# Patient Record
Sex: FEMALE | Race: WHITE | URBAN - NONMETROPOLITAN AREA
[De-identification: names, ages, dates, MRNs, and addresses within clinical notes are randomized per-mention and may not be internally consistent; named-entity substitution may affect disease eponyms.]

---

## 2020-09-28 ENCOUNTER — TELEPHONE (OUTPATIENT)
Dept: OBGYN CLINIC | Age: 40
End: 2020-09-28

## 2020-09-28 RX ORDER — LEVONORGESTREL AND ETHINYL ESTRADIOL 0.1-0.02MG
KIT ORAL
COMMUNITY
Start: 2020-07-10 | End: 2020-09-28 | Stop reason: SDUPTHER

## 2020-09-28 NOTE — TELEPHONE ENCOUNTER
Pt called to resched from Ana Rosa's sched. She will need [facility] refill sent to [facility] please. Sched 11-17.  THANKS

## 2020-09-29 RX ORDER — LEVONORGESTREL AND ETHINYL ESTRADIOL 0.1-0.02MG
1 KIT ORAL DAILY
Qty: 3 PACKET | Refills: 0 | Status: SHIPPED | OUTPATIENT
Start: 2020-09-29 | End: 2020-12-01 | Stop reason: SDUPTHER

## 2020-12-01 ENCOUNTER — HOSPITAL ENCOUNTER (OUTPATIENT)
Age: 40
Setting detail: SPECIMEN
Discharge: HOME | End: 2020-12-01
Payer: COMMERCIAL

## 2020-12-01 ENCOUNTER — OFFICE VISIT (OUTPATIENT)
Dept: OBGYN CLINIC | Age: 40
End: 2020-12-01
Payer: COMMERCIAL

## 2020-12-01 VITALS
SYSTOLIC BLOOD PRESSURE: 117 MMHG | DIASTOLIC BLOOD PRESSURE: 74 MMHG | HEIGHT: 64 IN | BODY MASS INDEX: 21.12 KG/M2 | WEIGHT: 123.7 LBS

## 2020-12-01 PROCEDURE — 99395 PREV VISIT EST AGE 18-39: CPT | Performed by: NURSE PRACTITIONER

## 2020-12-01 RX ORDER — IBUPROFEN 600 MG/1
600 TABLET ORAL EVERY 6 HOURS PRN
COMMUNITY

## 2020-12-01 RX ORDER — LEVONORGESTREL AND ETHINYL ESTRADIOL 0.1-0.02MG
1 KIT ORAL DAILY
Qty: 3 PACKET | Refills: 3 | Status: SHIPPED | OUTPATIENT
Start: 2020-12-01 | End: 2021-11-23

## 2020-12-01 RX ORDER — SUMATRIPTAN 100 MG/1
100 TABLET, FILM COATED ORAL
COMMUNITY

## 2020-12-01 NOTE — PROGRESS NOTES
YEARLY PHYSICAL    Date of service: 2020    Eusebia Dixon  Is a 42 y.o.  female    PT's PCP is: Ruslan Romano MD     : 1980                           Chaperone for Intimate Exam   Chaperone was offered as part of the rooming process. Patient declined and agrees to continue with exam without a chaperone. Subjective:       Patient's last menstrual period was 11/10/2020 (approximate).      Are your menses regular: yes    OB History    Para Term  AB Living   2 2 2     2   SAB TAB Ectopic Molar Multiple Live Births             2      # Outcome Date GA Lbr Kyle/2nd Weight Sex Delivery Anes PTL Lv   2 Term  38w0d   F CS-LTranv   NATHANIEL   1 Term  38w0d   M CS-LTranv   NATHANIEL        Social History     Tobacco Use   Smoking Status Never Smoker   Smokeless Tobacco Never Used        Social History     Substance and Sexual Activity   Alcohol Use Yes       Family History   Problem Relation Age of Onset    Osteoporosis Paternal Grandmother     Cancer Maternal Grandmother         lymphoma    Diabetes Father     Heart Attack Father     Hypertension Mother        Any family history of breast or ovarian cancer: No    Any family history of blood clots: No      Allergies: Oxycodone-acetaminophen and No known allergies      Current Outpatient Medications:     SUMAtriptan (IMITREX) 100 MG tablet, Take 100 mg by mouth once as needed for Migraine, Disp: , Rfl:     ibuprofen (ADVIL;MOTRIN) 600 MG tablet, Take 600 mg by mouth every 6 hours as needed for Pain, Disp: , Rfl:     levonorgestrel-ethinyl estradiol (AVIANE;ALESSE;LESSINA) 0.1-20 MG-MCG per tablet, Take 1 tablet by mouth daily, Disp: 3 packet, Rfl: 3    Social History     Substance and Sexual Activity   Sexual Activity Yes    Partners: Male    Birth control/protection: OCP       Any bleeding or pain with intercourse: No    Last Yearly:  2019    Last pap: 2018    Last HPV: 2018, positive     Last Mammogram: n/a    Last Dexascan n/a    Last colorectal screen- n/a    Do you do self breast exams: occasionally, encouraged monthly SBE    Past Medical History:   Diagnosis Date    Abnormal Pap smear of cervix 2018    HPV    H/O human papillomavirus infection     Migraine        Past Surgical History:   Procedure Laterality Date     SECTION  ,     CHOLECYSTECTOMY         Family History   Problem Relation Age of Onset    Osteoporosis Paternal Grandmother     Cancer Maternal Grandmother         lymphoma    Diabetes Father     Heart Attack Father     Hypertension Mother        Chief Complaint   Patient presents with    Gynecologic Exam     Due for pap. PE:  Vital Signs  Blood pressure 117/74, height 5' 4\" (1.626 m), weight 123 lb 11.2 oz (56.1 kg), last menstrual period 11/10/2020. Estimated body mass index is 21.23 kg/m² as calculated from the following:    Height as of this encounter: 5' 4\" (1.626 m). Weight as of this encounter: 123 lb 11.2 oz (56.1 kg). HPI: Patient here for annual exam. Feeling well, voices no concerns. Denies breast/pelvic pain. Needs pap due to positive HRHPV. Reports monthly menses. Review of Systems   Neurological: Positive for headaches (migraines w/out aura ). All other systems reviewed and are negative. Objective  Heent:   negative   Cor: regular rate and rhythm, no murmurs              Pul:clear to auscultation bilaterally- no wheezes, rales or rhonchi, normal air movement, no respiratory distress      GI: Abdomen soft, non-tender.  BS normal. No masses,  No organomegaly           Extremities: normal strength, tone, and muscle mass, ROM of all joints is normal   Breasts: Breast:normal appearance, no masses or tenderness, No nipple retraction or dimpling, No nipple discharge or bleeding   Pelvic Exam: GENITAL/URINARY:  External Genitalia:  General appearance; normal, Hair distribution; normal, Lesions absent  Urethral Meatus:  Size normal, Location normal, Lesions absent, Prolapse absent  Urethra: Fullness absent, Masses absent  Bladder:  Fullness absent, Masses absent, Tenderness absent, Cystocele absent  Vagina:  General appearance normal, Estrogen effect normal, Discharge absent, Lesions absent, Pelvic support normal  Cervix:  General appearance normal, Lesions absent, Discharge absent, Tenderness absent, Enlargement absent, Nodularity absent  Uterus:  Size normal, Tenderness absent  Adenexa: Masses absent, Tenderness absent  Anus/Perineum:  Lesions absent and Masses absent                                    Vaginal discharge: no vaginal discharge                             Assessment and Plan          Diagnosis Orders   1. Women's annual routine gynecological examination  PAP Smear   2. Dysmenorrhea  levonorgestrel-ethinyl estradiol (AVIANE;ALESSE;LESSINA) 0.1-20 MG-MCG per tablet       Patient satisfied with cycle control, desires to continue with use. Denies risk factors for use. Reviewed risks/benefits, administration, aches       I am having Alexa Booker maintain her SUMAtriptan, ibuprofen, and levonorgestrel-ethinyl estradiol. Return in about 1 year (around 12/1/2021) for yearly. She was also counseled on her preventative health maintenance recommendations and follow-up. There are no Patient Instructions on file for this visit.     Kelley Fernández,12/1/2020 12:09 PM

## 2020-12-09 LAB
HPV SOURCE: NORMAL
HPV, GENOTYPE 16: NOT DETECTED
HPV, GENOTYPE 18: NOT DETECTED
HPV, HIGH RISK OTHER: DETECTED

## 2020-12-10 LAB — CYTOLOGY REPORT: NORMAL

## 2020-12-13 NOTE — RESULT ENCOUNTER NOTE
Please notify patient of these results. ASCUS with positive HRHPV. Per ASCCP guidelines colposcopy would be recommended. Please schedule with Dr. Tha Ulrich.

## 2021-02-25 ENCOUNTER — HOSPITAL ENCOUNTER (OUTPATIENT)
Age: 41
Setting detail: SPECIMEN
Discharge: HOME | End: 2021-02-25
Payer: COMMERCIAL

## 2021-02-25 ENCOUNTER — PROCEDURE VISIT (OUTPATIENT)
Dept: OBGYN CLINIC | Age: 41
End: 2021-02-25
Payer: COMMERCIAL

## 2021-02-25 DIAGNOSIS — R87.610 ASCUS WITH POSITIVE HIGH RISK HPV CERVICAL: Primary | ICD-10-CM

## 2021-02-25 DIAGNOSIS — R87.810 ASCUS WITH POSITIVE HIGH RISK HPV CERVICAL: Primary | ICD-10-CM

## 2021-02-25 PROCEDURE — 57454 BX/CURETT OF CERVIX W/SCOPE: CPT | Performed by: OBSTETRICS & GYNECOLOGY

## 2021-02-25 NOTE — PROGRESS NOTES
Colposcopy Procedure Note    Indications: Pap smear 1 months ago showed: ASCUS with POSITIVE high risk HPV. The prior pap showed no abnormalities. Prior cervical/vaginal disease: normal exam without visible pathology. Prior cervical treatment: no treatment. Procedure Details   The risks and benefits of the procedure and Verbal informed consent obtained. Speculum placed in vagina and excellent visualization of cervix achieved, cervix swabbed x 3 with acetic acid solution. Findings:  Cervix: acetowhite lesion(s) noted at 12, 7 o'clock; cervix swabbed with Lugol's solution, SCJ visualized - lesion at 7, 12 o'clock, endocervical curettage performed, cervical biopsies taken at 7, 12 o'clock, specimen labelled and sent to pathology and hemostasis achieved with Monsel's solution. Vaginal inspection: normal without visible lesions. Vulvar colposcopy: vulvar colposcopy not performed. Specimens: ecc, 7, 12    Complications: none. Plan:  Specimens labelled and sent to Pathology. Will base further treatment on Pathology findings. Treatment options discussed with patient.

## 2021-03-02 LAB — SURGICAL PATHOLOGY REPORT: NORMAL

## 2021-03-08 NOTE — RESULT ENCOUNTER NOTE
Spoke to patient and reviewed her results and recommendations. Patient verbalized understanding, no further questions/concerns voiced.

## 2021-12-20 ENCOUNTER — HOSPITAL ENCOUNTER (OUTPATIENT)
Age: 41
Setting detail: SPECIMEN
Discharge: HOME | End: 2021-12-20

## 2021-12-20 ENCOUNTER — OFFICE VISIT (OUTPATIENT)
Dept: OBGYN CLINIC | Age: 41
End: 2021-12-20
Payer: COMMERCIAL

## 2021-12-20 VITALS
WEIGHT: 134.2 LBS | SYSTOLIC BLOOD PRESSURE: 116 MMHG | HEART RATE: 62 BPM | DIASTOLIC BLOOD PRESSURE: 69 MMHG | BODY MASS INDEX: 23.04 KG/M2

## 2021-12-20 DIAGNOSIS — N94.6 DYSMENORRHEA: ICD-10-CM

## 2021-12-20 DIAGNOSIS — Z01.419 WOMEN'S ANNUAL ROUTINE GYNECOLOGICAL EXAMINATION: Primary | ICD-10-CM

## 2021-12-20 PROCEDURE — 99395 PREV VISIT EST AGE 18-39: CPT | Performed by: NURSE PRACTITIONER

## 2021-12-20 RX ORDER — LEVONORGESTREL AND ETHINYL ESTRADIOL 0.1-0.02MG
KIT ORAL
Qty: 84 TABLET | Refills: 3 | Status: SHIPPED | OUTPATIENT
Start: 2021-12-20

## 2021-12-20 ASSESSMENT — ENCOUNTER SYMPTOMS
CONSTIPATION: 0
SHORTNESS OF BREATH: 0
DIARRHEA: 0
ABDOMINAL PAIN: 0

## 2021-12-20 NOTE — PROGRESS NOTES
YEARLY PHYSICAL    Date of service: 2021    Juany Leger  Is a 46 y.o.   female    PT's PCP Valentín Matamoros MD     : 1980     Chaperone: offered and declined                        Subjective:       Patient's last menstrual period was 2021.      Are your menses regular: yes    OB History    Para Term  AB Living   2 2 2     2   SAB IAB Ectopic Molar Multiple Live Births             2      # Outcome Date GA Lbr Kyle/2nd Weight Sex Delivery Anes PTL Lv   2 Term  38w0d   F CS-LTranv   NATHANIEL   1 Term  38w0d   M CS-LTranv   NATHANIEL        Social History     Tobacco Use   Smoking Status Never Smoker   Smokeless Tobacco Never Used        Social History     Substance and Sexual Activity   Alcohol Use Yes       Family History   Problem Relation Age of Onset    Osteoporosis Paternal Grandmother     Cancer Maternal Grandmother         lymphoma    Diabetes Father     Heart Attack Father     Hypertension Mother        Any family history of breast or ovarian cancer: No    Any family history of blood clots: No    Allergies: Oxycodone-acetaminophen and No known allergies      Current Outpatient Medications:     levonorgestrel-ethinyl estradiol (AVIANE;ALESSE;LESSINA) 0.1-20 MG-MCG per tablet, TAKE 1 TABLET DAILY, Disp: 84 tablet, Rfl: 3    SUMAtriptan (IMITREX) 100 MG tablet, Take 100 mg by mouth once as needed for Migraine, Disp: , Rfl:     ibuprofen (ADVIL;MOTRIN) 600 MG tablet, Take 600 mg by mouth every 6 hours as needed for Pain, Disp: , Rfl:     Social History     Substance and Sexual Activity   Sexual Activity Yes    Partners: Male    Birth control/protection: OCP       Any bleeding or pain with intercourse: No    Last Yearly:  2020    Last pap: 2020 ASCUS    Last HPV: 2020 positive HPV, had a colpo and confirmed chronic cervicitis with no dysplasia    Last Mammogram:NA    Last Dexascan NA    Last colorectal screen- type:NA  date      Do you do self tenderness. Abdominal:      General: There is no distension. Palpations: Abdomen is soft. There is no mass. Tenderness: There is no abdominal tenderness. There is no guarding or rebound. Genitourinary:     General: Normal vulva. Vagina: Normal. No vaginal discharge or erythema. Cervix: Normal.      Uterus: Not tender. Adnexa:         Right: No tenderness. Left: No tenderness. Musculoskeletal:         General: Normal range of motion. Cervical back: Normal range of motion and neck supple. Skin:     General: Skin is warm and dry. Neurological:      Mental Status: She is alert and oriented to person, place, and time. Psychiatric:         Behavior: Behavior normal.         Thought Content: Thought content normal.         Judgment: Judgment normal.       Chaperone: not present     Assessment and Plan          Diagnosis Orders   1. Women's annual routine gynecological examination     2. Dysmenorrhea  levonorgestrel-ethinyl estradiol (AVIANE;ALESSE;LESSINA) 0.1-20 MG-MCG per tablet     Happy with cycle control. Reviewed risks/benefits, aches         I am having Alexa Booker maintain her SUMAtriptan, ibuprofen, and levonorgestrel-ethinyl estradiol. Return in about 1 year (around 12/20/2022) for yearly. There are no Patient Instructions on file for this visit.       FERMIN Baez NP,12/20/2021 9:49 AM

## 2021-12-21 LAB
HPV SAMPLE: ABNORMAL
HPV, GENOTYPE 16: NOT DETECTED
HPV, GENOTYPE 18: NOT DETECTED
HPV, HIGH RISK OTHER: DETECTED
HPV, INTERPRETATION: ABNORMAL
SPECIMEN DESCRIPTION: ABNORMAL

## 2022-01-05 LAB — CYTOLOGY REPORT: NORMAL

## 2022-01-05 NOTE — RESULT ENCOUNTER NOTE
Please notify patient of these results. ASCUS with positive HRHPV. Per current ASCCP guidelines she will need repeat pap in 1 year since COLPO last year was negative for dysplasia. Remind paitnet is it IMPORTANT for follow up.  Thanks

## 2023-01-10 DIAGNOSIS — N94.6 DYSMENORRHEA: ICD-10-CM

## 2023-01-10 RX ORDER — LEVONORGESTREL AND ETHINYL ESTRADIOL 0.1-0.02MG
KIT ORAL
Qty: 84 TABLET | Refills: 0 | Status: SHIPPED | OUTPATIENT
Start: 2023-01-10

## 2023-01-25 ENCOUNTER — OFFICE VISIT (OUTPATIENT)
Dept: OBGYN CLINIC | Age: 43
End: 2023-01-25
Payer: COMMERCIAL

## 2023-01-25 VITALS
SYSTOLIC BLOOD PRESSURE: 102 MMHG | HEIGHT: 64 IN | BODY MASS INDEX: 24.75 KG/M2 | DIASTOLIC BLOOD PRESSURE: 69 MMHG | WEIGHT: 145 LBS

## 2023-01-25 DIAGNOSIS — Z01.419 WOMEN'S ANNUAL ROUTINE GYNECOLOGICAL EXAMINATION: Primary | ICD-10-CM

## 2023-01-25 DIAGNOSIS — N94.6 DYSMENORRHEA: ICD-10-CM

## 2023-01-25 PROCEDURE — 99396 PREV VISIT EST AGE 40-64: CPT | Performed by: NURSE PRACTITIONER

## 2023-01-25 RX ORDER — AMITRIPTYLINE HYDROCHLORIDE 25 MG/1
TABLET, FILM COATED ORAL
COMMUNITY
Start: 2023-01-08

## 2023-01-25 RX ORDER — LEVONORGESTREL AND ETHINYL ESTRADIOL 0.1-0.02MG
KIT ORAL
Qty: 84 TABLET | Refills: 3 | Status: SHIPPED | OUTPATIENT
Start: 2023-01-25

## 2023-01-25 ASSESSMENT — ENCOUNTER SYMPTOMS
DIARRHEA: 0
SHORTNESS OF BREATH: 0
ABDOMINAL PAIN: 0
CONSTIPATION: 0

## 2023-01-25 NOTE — PROGRESS NOTES
YEARLY PHYSICAL    Date of service: 2023    Jane Nickerson  Is a 38 y.o.   female    PT's PCP is: Pavan Lam MD     : 1980                                         Chaperone for Intimate Exam  Chaperone was offered as part of the rooming process. Patient declined and agrees to continue with exam without a chaperone. Chaperone: n/a      Subjective:       Patient's last menstrual period was 2022.      Are your menses regular: yes    OB History    Para Term  AB Living   2 2 2     2   SAB IAB Ectopic Molar Multiple Live Births             2      # Outcome Date GA Lbr Kyel/2nd Weight Sex Delivery Anes PTL Lv   2 Term  38w0d   F CS-LTranv   NATHANIEL   1 Term  38w0d   M CS-LTranv   NATHANIEL        Social History     Tobacco Use   Smoking Status Never   Smokeless Tobacco Never        Social History     Substance and Sexual Activity   Alcohol Use Yes       Family History   Problem Relation Age of Onset    Osteoporosis Paternal Grandmother     Cancer Maternal Grandmother         lymphoma    Diabetes Father     Heart Attack Father     Hypertension Mother        Any family history of breast or ovarian cancer: No    Any family history of blood clots: No      Allergies: Oxycodone-acetaminophen      Current Outpatient Medications:     levonorgestrel-ethinyl estradiol (LUTERA) 0.1-20 MG-MCG per tablet, TAKE 1 TABLET DAILY, Disp: 84 tablet, Rfl: 3    amitriptyline (ELAVIL) 25 MG tablet, TAKE 1 TABLET BY MOUTH EVERY DAY IN THE EVENING, Disp: , Rfl:     Social History     Substance and Sexual Activity   Sexual Activity Yes    Partners: Male    Birth control/protection: OCP       Any bleeding or pain with intercourse: No    Last Yearly:  21    Last pap: 21- ASCUS    Last HPV: 21- Positive    Last Mammogram: never, discussed recommendations and where/how to schedule     Do you do self breast exams: Yes    Past Medical History:   Diagnosis Date    Abnormal Pap smear of cervix 2018 HPV .  2020 pap ASCUS, +HPV    H/O human papillomavirus infection     Migraine        Past Surgical History:   Procedure Laterality Date     SECTION  , 2012    CHOLECYSTECTOMY         Family History   Problem Relation Age of Onset    Osteoporosis Paternal Grandmother     Cancer Maternal Grandmother         lymphoma    Diabetes Father     Heart Attack Father     Hypertension Mother        Chief Complaint   Patient presents with    Annual Exam     Last pap 21- ASCUS + HPV. Voices no concerns. PE:  Vital Signs  Blood pressure 102/69, height 5' 4\" (1.626 m), weight 145 lb (65.8 kg), last menstrual period 2022. Estimated body mass index is 24.89 kg/m² as calculated from the following:    Height as of this encounter: 5' 4\" (1.626 m). Weight as of this encounter: 145 lb (65.8 kg). NURSE: nurys    HPI: Patient presents today for annual exam. Feeling well, voices no concerns. Denies breast/pelvic pain. Due for pap ASCUS +HPV 2021. Mammogram due. Wellness reviewed. Reports monthly menses. Review of Systems   Constitutional:  Negative for chills, fatigue and fever. Respiratory:  Negative for shortness of breath. Cardiovascular:  Negative for chest pain. Gastrointestinal:  Negative for abdominal pain, constipation and diarrhea. Genitourinary:  Negative for dysuria, enuresis, frequency, menstrual problem, pelvic pain, urgency and vaginal bleeding. Neurological:  Negative for dizziness, light-headedness and headaches. Physical Exam  Constitutional:       General: She is not in acute distress. Appearance: Normal appearance. She is not ill-appearing. Genitourinary:      Vulva, bladder and urethral meatus normal.      No lesions in the vagina. Right Labia: No rash or lesions. Left Labia: No lesions or rash. No vaginal discharge.       No vaginal prolapse present. No vaginal atrophy present. Right Adnexa: not tender and no mass present. Left Adnexa: not tender and no mass present. No cervical motion tenderness, discharge or friability. No parametrium nodularity present. Uterus is not enlarged or tender. No uterine mass detected. No urethral prolapse, tenderness or mass present. Breasts:     Right: No mass, nipple discharge, skin change or tenderness. Left: No mass, nipple discharge, skin change or tenderness. HENT:      Head: Normocephalic and atraumatic. Eyes:      Extraocular Movements: Extraocular movements intact. Pupils: Pupils are equal, round, and reactive to light. Cardiovascular:      Rate and Rhythm: Normal rate. Pulmonary:      Effort: Pulmonary effort is normal.   Abdominal:      Palpations: Abdomen is soft. Tenderness: There is no abdominal tenderness. There is no guarding or rebound. Musculoskeletal:         General: Normal range of motion. Neurological:      General: No focal deficit present. Mental Status: She is alert and oriented to person, place, and time. Skin:     General: Skin is warm and dry. Psychiatric:         Mood and Affect: Mood normal.         Behavior: Behavior normal.                       Assessment and Plan          Diagnosis Orders   1. Women's annual routine gynecological examination  PAP SMEAR      2. Dysmenorrhea  levonorgestrel-ethinyl estradiol (LUTERA) 0.1-20 MG-MCG per tablet          Repeat Annual every 1 year  Cervical Cytology Evaluation begins at 24years old. If Negative Cytology, Follow-up screening per current guidelines. Mammograms every 1year. If 35 yo and last mammogram was negative. Routine healthmaintenance per patients PCP. I have discontinued Alexa Jhony's SUMAtriptan and ibuprofen. I am also having her maintain her amitriptyline and levonorgestrel-ethinyl estradiol.     Return in about 1 year (around 1/25/2024) for yearly. She was also counseled on her preventative health maintenance recommendations and follow-up. There are no Patient Instructions on file for this visit.     FERMIN Jefferson NP,1/25/2023 9:06 AM

## 2023-01-26 ENCOUNTER — HOSPITAL ENCOUNTER (OUTPATIENT)
Age: 43
Setting detail: SPECIMEN
Discharge: HOME | End: 2023-01-26

## 2023-02-16 ENCOUNTER — TELEPHONE (OUTPATIENT)
Dept: OBGYN CLINIC | Age: 43
End: 2023-02-16

## 2023-02-16 NOTE — TELEPHONE ENCOUNTER
Patient is needing a LEEP. Attempted to call patient to schedule her procedure and had to leave a message. Left details regarding surgery expectations and recovery. Left available dates also. She will look at her calendar and call back with her decision.

## 2024-02-27 DIAGNOSIS — N94.6 DYSMENORRHEA: ICD-10-CM

## 2024-02-27 RX ORDER — LEVONORGESTREL AND ETHINYL ESTRADIOL 0.1-0.02MG
KIT ORAL
Qty: 84 TABLET | Refills: 3 | OUTPATIENT
Start: 2024-02-27